# Patient Record
Sex: MALE | Race: WHITE | NOT HISPANIC OR LATINO | URBAN - METROPOLITAN AREA
[De-identification: names, ages, dates, MRNs, and addresses within clinical notes are randomized per-mention and may not be internally consistent; named-entity substitution may affect disease eponyms.]

---

## 2023-11-14 ENCOUNTER — INPATIENT (INPATIENT)
Facility: HOSPITAL | Age: 63
LOS: 0 days | Discharge: HOME | DRG: 274 | End: 2023-11-15
Attending: INTERNAL MEDICINE | Admitting: INTERNAL MEDICINE
Payer: COMMERCIAL

## 2023-11-14 VITALS — HEIGHT: 68 IN

## 2023-11-14 DIAGNOSIS — Z98.890 OTHER SPECIFIED POSTPROCEDURAL STATES: Chronic | ICD-10-CM

## 2023-11-14 DIAGNOSIS — I48.0 PAROXYSMAL ATRIAL FIBRILLATION: ICD-10-CM

## 2023-11-14 DIAGNOSIS — Z90.49 ACQUIRED ABSENCE OF OTHER SPECIFIED PARTS OF DIGESTIVE TRACT: Chronic | ICD-10-CM

## 2023-11-14 LAB
ANION GAP SERPL CALC-SCNC: 10 MMOL/L — SIGNIFICANT CHANGE UP (ref 5–17)
ANION GAP SERPL CALC-SCNC: 10 MMOL/L — SIGNIFICANT CHANGE UP (ref 5–17)
APTT BLD: 42.5 SEC — HIGH (ref 24.5–35.6)
APTT BLD: 42.5 SEC — HIGH (ref 24.5–35.6)
BLD GP AB SCN SERPL QL: NEGATIVE — SIGNIFICANT CHANGE UP
BLD GP AB SCN SERPL QL: NEGATIVE — SIGNIFICANT CHANGE UP
BUN SERPL-MCNC: 13 MG/DL — SIGNIFICANT CHANGE UP (ref 7–23)
BUN SERPL-MCNC: 13 MG/DL — SIGNIFICANT CHANGE UP (ref 7–23)
CALCIUM SERPL-MCNC: 9.5 MG/DL — SIGNIFICANT CHANGE UP (ref 8.4–10.5)
CALCIUM SERPL-MCNC: 9.5 MG/DL — SIGNIFICANT CHANGE UP (ref 8.4–10.5)
CHLORIDE SERPL-SCNC: 104 MMOL/L — SIGNIFICANT CHANGE UP (ref 96–108)
CHLORIDE SERPL-SCNC: 104 MMOL/L — SIGNIFICANT CHANGE UP (ref 96–108)
CO2 SERPL-SCNC: 26 MMOL/L — SIGNIFICANT CHANGE UP (ref 22–31)
CO2 SERPL-SCNC: 26 MMOL/L — SIGNIFICANT CHANGE UP (ref 22–31)
CREAT SERPL-MCNC: 1.38 MG/DL — HIGH (ref 0.5–1.3)
CREAT SERPL-MCNC: 1.38 MG/DL — HIGH (ref 0.5–1.3)
EGFR: 57 ML/MIN/1.73M2 — LOW
EGFR: 57 ML/MIN/1.73M2 — LOW
GLUCOSE SERPL-MCNC: 91 MG/DL — SIGNIFICANT CHANGE UP (ref 70–99)
GLUCOSE SERPL-MCNC: 91 MG/DL — SIGNIFICANT CHANGE UP (ref 70–99)
HCT VFR BLD CALC: 45 % — SIGNIFICANT CHANGE UP (ref 39–50)
HCT VFR BLD CALC: 45 % — SIGNIFICANT CHANGE UP (ref 39–50)
HGB BLD-MCNC: 14.6 G/DL — SIGNIFICANT CHANGE UP (ref 13–17)
HGB BLD-MCNC: 14.6 G/DL — SIGNIFICANT CHANGE UP (ref 13–17)
INR BLD: 1.31 — HIGH (ref 0.85–1.18)
INR BLD: 1.31 — HIGH (ref 0.85–1.18)
ISTAT ACTK (ACTIVATED CLOTTING TIME KAOLIN): 358 SEC — HIGH (ref 74–137)
ISTAT ACTK (ACTIVATED CLOTTING TIME KAOLIN): 358 SEC — HIGH (ref 74–137)
ISTAT ACTK (ACTIVATED CLOTTING TIME KAOLIN): 363 SEC — HIGH (ref 74–137)
ISTAT ACTK (ACTIVATED CLOTTING TIME KAOLIN): 363 SEC — HIGH (ref 74–137)
ISTAT ACTK (ACTIVATED CLOTTING TIME KAOLIN): 379 SEC — HIGH (ref 74–137)
ISTAT ACTK (ACTIVATED CLOTTING TIME KAOLIN): 379 SEC — HIGH (ref 74–137)
ISTAT ACTK (ACTIVATED CLOTTING TIME KAOLIN): 401 SEC — HIGH (ref 74–137)
ISTAT ACTK (ACTIVATED CLOTTING TIME KAOLIN): 401 SEC — HIGH (ref 74–137)
MCHC RBC-ENTMCNC: 28.8 PG — SIGNIFICANT CHANGE UP (ref 27–34)
MCHC RBC-ENTMCNC: 28.8 PG — SIGNIFICANT CHANGE UP (ref 27–34)
MCHC RBC-ENTMCNC: 32.4 GM/DL — SIGNIFICANT CHANGE UP (ref 32–36)
MCHC RBC-ENTMCNC: 32.4 GM/DL — SIGNIFICANT CHANGE UP (ref 32–36)
MCV RBC AUTO: 88.8 FL — SIGNIFICANT CHANGE UP (ref 80–100)
MCV RBC AUTO: 88.8 FL — SIGNIFICANT CHANGE UP (ref 80–100)
NRBC # BLD: 0 /100 WBCS — SIGNIFICANT CHANGE UP (ref 0–0)
NRBC # BLD: 0 /100 WBCS — SIGNIFICANT CHANGE UP (ref 0–0)
PLATELET # BLD AUTO: 201 K/UL — SIGNIFICANT CHANGE UP (ref 150–400)
PLATELET # BLD AUTO: 201 K/UL — SIGNIFICANT CHANGE UP (ref 150–400)
POTASSIUM SERPL-MCNC: 4.4 MMOL/L — SIGNIFICANT CHANGE UP (ref 3.5–5.3)
POTASSIUM SERPL-MCNC: 4.4 MMOL/L — SIGNIFICANT CHANGE UP (ref 3.5–5.3)
POTASSIUM SERPL-SCNC: 4.4 MMOL/L — SIGNIFICANT CHANGE UP (ref 3.5–5.3)
POTASSIUM SERPL-SCNC: 4.4 MMOL/L — SIGNIFICANT CHANGE UP (ref 3.5–5.3)
PROTHROM AB SERPL-ACNC: 14.8 SEC — HIGH (ref 9.5–13)
PROTHROM AB SERPL-ACNC: 14.8 SEC — HIGH (ref 9.5–13)
RBC # BLD: 5.07 M/UL — SIGNIFICANT CHANGE UP (ref 4.2–5.8)
RBC # BLD: 5.07 M/UL — SIGNIFICANT CHANGE UP (ref 4.2–5.8)
RBC # FLD: 13.3 % — SIGNIFICANT CHANGE UP (ref 10.3–14.5)
RBC # FLD: 13.3 % — SIGNIFICANT CHANGE UP (ref 10.3–14.5)
RH IG SCN BLD-IMP: POSITIVE — SIGNIFICANT CHANGE UP
RH IG SCN BLD-IMP: POSITIVE — SIGNIFICANT CHANGE UP
SODIUM SERPL-SCNC: 140 MMOL/L — SIGNIFICANT CHANGE UP (ref 135–145)
SODIUM SERPL-SCNC: 140 MMOL/L — SIGNIFICANT CHANGE UP (ref 135–145)
WBC # BLD: 26.42 K/UL — HIGH (ref 3.8–10.5)
WBC # BLD: 26.42 K/UL — HIGH (ref 3.8–10.5)
WBC # FLD AUTO: 26.42 K/UL — HIGH (ref 3.8–10.5)
WBC # FLD AUTO: 26.42 K/UL — HIGH (ref 3.8–10.5)

## 2023-11-14 RX ORDER — SACUBITRIL AND VALSARTAN 24; 26 MG/1; MG/1
1 TABLET, FILM COATED ORAL
Refills: 0 | DISCHARGE

## 2023-11-14 RX ORDER — ATORVASTATIN CALCIUM 80 MG/1
80 TABLET, FILM COATED ORAL AT BEDTIME
Refills: 0 | Status: DISCONTINUED | OUTPATIENT
Start: 2023-11-14 | End: 2023-11-15

## 2023-11-14 RX ORDER — SACUBITRIL AND VALSARTAN 24; 26 MG/1; MG/1
1 TABLET, FILM COATED ORAL
Refills: 0 | Status: DISCONTINUED | OUTPATIENT
Start: 2023-11-14 | End: 2023-11-15

## 2023-11-14 RX ORDER — EMPAGLIFLOZIN 10 MG/1
1 TABLET, FILM COATED ORAL
Refills: 0 | DISCHARGE

## 2023-11-14 RX ORDER — COLCHICINE 0.6 MG
0.6 TABLET ORAL DAILY
Refills: 0 | Status: DISCONTINUED | OUTPATIENT
Start: 2023-11-14 | End: 2023-11-15

## 2023-11-14 RX ORDER — PANTOPRAZOLE SODIUM 20 MG/1
40 TABLET, DELAYED RELEASE ORAL
Refills: 0 | Status: DISCONTINUED | OUTPATIENT
Start: 2023-11-14 | End: 2023-11-15

## 2023-11-14 RX ORDER — CLOPIDOGREL BISULFATE 75 MG/1
1 TABLET, FILM COATED ORAL
Refills: 0 | DISCHARGE

## 2023-11-14 RX ORDER — APIXABAN 2.5 MG/1
1 TABLET, FILM COATED ORAL
Refills: 0 | DISCHARGE

## 2023-11-14 RX ORDER — ATORVASTATIN CALCIUM 80 MG/1
1 TABLET, FILM COATED ORAL
Refills: 0 | DISCHARGE

## 2023-11-14 RX ORDER — APIXABAN 2.5 MG/1
5 TABLET, FILM COATED ORAL
Refills: 0 | Status: DISCONTINUED | OUTPATIENT
Start: 2023-11-14 | End: 2023-11-15

## 2023-11-14 RX ORDER — CLOPIDOGREL BISULFATE 75 MG/1
75 TABLET, FILM COATED ORAL DAILY
Refills: 0 | Status: DISCONTINUED | OUTPATIENT
Start: 2023-11-14 | End: 2023-11-15

## 2023-11-14 RX ORDER — SPIRONOLACTONE 25 MG/1
1 TABLET, FILM COATED ORAL
Refills: 0 | DISCHARGE

## 2023-11-14 RX ORDER — SPIRONOLACTONE 25 MG/1
25 TABLET, FILM COATED ORAL DAILY
Refills: 0 | Status: DISCONTINUED | OUTPATIENT
Start: 2023-11-14 | End: 2023-11-15

## 2023-11-14 RX ORDER — LIDOCAINE HYDROCHLORIDE AND EPINEPHRINE 10; 10 MG/ML; UG/ML
10 INJECTION, SOLUTION INFILTRATION; PERINEURAL ONCE
Refills: 0 | Status: DISCONTINUED | OUTPATIENT
Start: 2023-11-14 | End: 2023-11-15

## 2023-11-14 RX ORDER — METOPROLOL TARTRATE 50 MG
25 TABLET ORAL DAILY
Refills: 0 | Status: DISCONTINUED | OUTPATIENT
Start: 2023-11-14 | End: 2023-11-15

## 2023-11-14 RX ORDER — METOPROLOL TARTRATE 50 MG
50 TABLET ORAL DAILY
Refills: 0 | Status: DISCONTINUED | OUTPATIENT
Start: 2023-11-14 | End: 2023-11-14

## 2023-11-14 RX ADMIN — Medication 0.6 MILLIGRAM(S): at 18:12

## 2023-11-14 RX ADMIN — APIXABAN 5 MILLIGRAM(S): 2.5 TABLET, FILM COATED ORAL at 22:05

## 2023-11-14 NOTE — H&P ADULT - HISTORY OF PRESENT ILLNESS
62 yo M with history of HTm, HLD, DM, prostate ca, s/p prostatectomy, paroxysmal atrial fibrillation presents for scheduled ablation.  Patient states that he experienced palpitation since he was about 20 yo, they lasted for short time  but lately palpitations became longer in durations and intensity , last episode lasted about 2 day , patient was symptomatic with fatigue,  decreased  ADL. He is complaint with Eliquis (last dose 11/14/23), today he is in sinus rhythm .     64 yo M with history of HTM, HLD, DM, CAD, s/p stents ,  prostate ca, s/p prostatectomy, paroxysmal atrial fibrillation presents for scheduled ablation.  Patient states that he experienced palpitation since he was about 18 yo, they lasted for short time  but lately palpitations became longer in durations and intensity , last episode lasted about 2 day , patient was symptomatic with fatigue,  decreased  ADL. He is complaint with Eliquis (last dose 11/14/23), today he is in sinus rhythm .

## 2023-11-14 NOTE — H&P ADULT - NSICDXPASTSURGICALHX_GEN_ALL_CORE_FT
PAST SURGICAL HISTORY:  H/O hernia repair     History of appendectomy     History of prostate surgery

## 2023-11-14 NOTE — BRIEF OPERATIVE NOTE - OPERATION/FINDINGS
S/p double transseptal puncture with LA voltage map showing normal voltage throughout with highly irritable/active pulmonary veins (3 on the right, 2 on the left). S/p PVI with dissociated potentials from the right pulmonary veins. Entrance and exit block achieved and confirmed. Small posterior wall noted so performed roof line for remaining AFib/flutter substrate remaining after PVI. CTI line performed for prevention of typical flutter. Bidirectional block confirmed with all lines. Heparinized throughout and reversed with protamine. Vascade and manual pressure for hemostasis. No immediate complications.

## 2023-11-14 NOTE — PATIENT PROFILE ADULT - FALL HARM RISK - UNIVERSAL INTERVENTIONS
Bed in lowest position, wheels locked, appropriate side rails in place/Call bell, personal items and telephone in reach/Instruct patient to call for assistance before getting out of bed or chair/Non-slip footwear when patient is out of bed/[place] to call system/Physically safe environment - no spills, clutter or unnecessary equipment/Purposeful Proactive Rounding/Room/bathroom lighting operational, light cord in reach

## 2023-11-14 NOTE — H&P ADULT - ASSESSMENT
62 yo M with history of HTm, HLD, DM, prostate ca, s/p prostatectomy, paroxysmal atrial fibrillation presents for scheduled ablation.   64 yo M with history of HTm, HLD, DM,  CAD, prostate ca, s/p prostatectomy, paroxysmal atrial fibrillation presents for scheduled ablation.

## 2023-11-14 NOTE — H&P ADULT - NSICDXPASTMEDICALHX_GEN_ALL_CORE_FT
PAST MEDICAL HISTORY:  History of prostate surgery     HLD (hyperlipidemia)     HTN (hypertension)

## 2023-11-15 VITALS
RESPIRATION RATE: 17 BRPM | OXYGEN SATURATION: 93 % | HEART RATE: 80 BPM | SYSTOLIC BLOOD PRESSURE: 91 MMHG | DIASTOLIC BLOOD PRESSURE: 52 MMHG

## 2023-11-15 LAB
HCV AB S/CO SERPL IA: 0.05 S/CO — SIGNIFICANT CHANGE UP
HCV AB S/CO SERPL IA: 0.05 S/CO — SIGNIFICANT CHANGE UP
HCV AB SERPL-IMP: SIGNIFICANT CHANGE UP
HCV AB SERPL-IMP: SIGNIFICANT CHANGE UP
ISTAT INR: 1.3 — HIGH (ref 0.88–1.16)
ISTAT INR: 1.3 — HIGH (ref 0.88–1.16)
ISTAT PT: 14.9 SEC — HIGH (ref 10–12.9)
ISTAT PT: 14.9 SEC — HIGH (ref 10–12.9)
ISTAT VENOUS BE: 1 MMOL/L — SIGNIFICANT CHANGE UP (ref -2–3)
ISTAT VENOUS GLUCOSE: 83 MG/DL — SIGNIFICANT CHANGE UP (ref 70–99)
ISTAT VENOUS GLUCOSE: 83 MG/DL — SIGNIFICANT CHANGE UP (ref 70–99)
ISTAT VENOUS GLUCOSE: 88 MG/DL — SIGNIFICANT CHANGE UP (ref 70–99)
ISTAT VENOUS GLUCOSE: 88 MG/DL — SIGNIFICANT CHANGE UP (ref 70–99)
ISTAT VENOUS HCO3: 25 MMOL/L — SIGNIFICANT CHANGE UP (ref 23–28)
ISTAT VENOUS HCO3: 25 MMOL/L — SIGNIFICANT CHANGE UP (ref 23–28)
ISTAT VENOUS HCO3: 27 MMOL/L — SIGNIFICANT CHANGE UP (ref 23–28)
ISTAT VENOUS HCO3: 27 MMOL/L — SIGNIFICANT CHANGE UP (ref 23–28)
ISTAT VENOUS HEMATOCRIT: 41 % — SIGNIFICANT CHANGE UP (ref 39–50)
ISTAT VENOUS HEMATOCRIT: 41 % — SIGNIFICANT CHANGE UP (ref 39–50)
ISTAT VENOUS HEMATOCRIT: 44 % — SIGNIFICANT CHANGE UP (ref 39–50)
ISTAT VENOUS HEMATOCRIT: 44 % — SIGNIFICANT CHANGE UP (ref 39–50)
ISTAT VENOUS HEMOGLOBIN: 13.9 GM/DL — SIGNIFICANT CHANGE UP (ref 13–17)
ISTAT VENOUS HEMOGLOBIN: 13.9 GM/DL — SIGNIFICANT CHANGE UP (ref 13–17)
ISTAT VENOUS HEMOGLOBIN: 15 GM/DL — SIGNIFICANT CHANGE UP (ref 13–17)
ISTAT VENOUS HEMOGLOBIN: 15 GM/DL — SIGNIFICANT CHANGE UP (ref 13–17)
ISTAT VENOUS IONIZED CALCIUM: 0.94 MMOL/L — LOW (ref 1.12–1.3)
ISTAT VENOUS IONIZED CALCIUM: 0.94 MMOL/L — LOW (ref 1.12–1.3)
ISTAT VENOUS IONIZED CALCIUM: 1.22 MMOL/L — SIGNIFICANT CHANGE UP (ref 1.12–1.3)
ISTAT VENOUS IONIZED CALCIUM: 1.22 MMOL/L — SIGNIFICANT CHANGE UP (ref 1.12–1.3)
ISTAT VENOUS PCO2: 37 MMHG — LOW (ref 41–51)
ISTAT VENOUS PCO2: 37 MMHG — LOW (ref 41–51)
ISTAT VENOUS PCO2: 43 MMHG — SIGNIFICANT CHANGE UP (ref 41–51)
ISTAT VENOUS PCO2: 43 MMHG — SIGNIFICANT CHANGE UP (ref 41–51)
ISTAT VENOUS PH: 7.4 — SIGNIFICANT CHANGE UP (ref 7.31–7.41)
ISTAT VENOUS PH: 7.4 — SIGNIFICANT CHANGE UP (ref 7.31–7.41)
ISTAT VENOUS PH: 7.44 — HIGH (ref 7.31–7.41)
ISTAT VENOUS PH: 7.44 — HIGH (ref 7.31–7.41)
ISTAT VENOUS PO2: <66 MMHG — HIGH (ref 35–40)
ISTAT VENOUS PO2: <66 MMHG — HIGH (ref 35–40)
ISTAT VENOUS PO2: <66 MMHG — LOW (ref 35–40)
ISTAT VENOUS PO2: <66 MMHG — LOW (ref 35–40)
ISTAT VENOUS POTASSIUM: 5.2 MMOL/L — SIGNIFICANT CHANGE UP (ref 3.5–5.3)
ISTAT VENOUS POTASSIUM: 5.2 MMOL/L — SIGNIFICANT CHANGE UP (ref 3.5–5.3)
ISTAT VENOUS POTASSIUM: >9 MMOL/L — CRITICAL HIGH (ref 3.5–5.3)
ISTAT VENOUS POTASSIUM: >9 MMOL/L — CRITICAL HIGH (ref 3.5–5.3)
ISTAT VENOUS SO2: 63 % — SIGNIFICANT CHANGE UP
ISTAT VENOUS SO2: 63 % — SIGNIFICANT CHANGE UP
ISTAT VENOUS SO2: 88 % — SIGNIFICANT CHANGE UP
ISTAT VENOUS SO2: 88 % — SIGNIFICANT CHANGE UP
ISTAT VENOUS SODIUM: 132 MMOL/L — LOW (ref 135–145)
ISTAT VENOUS SODIUM: 132 MMOL/L — LOW (ref 135–145)
ISTAT VENOUS SODIUM: 140 MMOL/L — SIGNIFICANT CHANGE UP (ref 135–145)
ISTAT VENOUS SODIUM: 140 MMOL/L — SIGNIFICANT CHANGE UP (ref 135–145)
ISTAT VENOUS TCO2: 26 MMOL/L — SIGNIFICANT CHANGE UP (ref 22–31)
ISTAT VENOUS TCO2: 26 MMOL/L — SIGNIFICANT CHANGE UP (ref 22–31)
ISTAT VENOUS TCO2: 28 MMOL/L — SIGNIFICANT CHANGE UP (ref 22–31)
ISTAT VENOUS TCO2: 28 MMOL/L — SIGNIFICANT CHANGE UP (ref 22–31)
POCT ISTAT CREATININE: 1.5 MG/DL — HIGH (ref 0.5–1.3)
POCT ISTAT CREATININE: 1.5 MG/DL — HIGH (ref 0.5–1.3)

## 2023-11-15 PROCEDURE — 86900 BLOOD TYPING SEROLOGIC ABO: CPT

## 2023-11-15 PROCEDURE — 85730 THROMBOPLASTIN TIME PARTIAL: CPT

## 2023-11-15 PROCEDURE — 80048 BASIC METABOLIC PNL TOTAL CA: CPT

## 2023-11-15 PROCEDURE — C1730: CPT

## 2023-11-15 PROCEDURE — 82947 ASSAY GLUCOSE BLOOD QUANT: CPT

## 2023-11-15 PROCEDURE — C1760: CPT

## 2023-11-15 PROCEDURE — 85027 COMPLETE CBC AUTOMATED: CPT

## 2023-11-15 PROCEDURE — 82565 ASSAY OF CREATININE: CPT

## 2023-11-15 PROCEDURE — C2630: CPT

## 2023-11-15 PROCEDURE — 85347 COAGULATION TIME ACTIVATED: CPT

## 2023-11-15 PROCEDURE — C1769: CPT

## 2023-11-15 PROCEDURE — 82803 BLOOD GASES ANY COMBINATION: CPT

## 2023-11-15 PROCEDURE — 82330 ASSAY OF CALCIUM: CPT

## 2023-11-15 PROCEDURE — C1766: CPT

## 2023-11-15 PROCEDURE — 99239 HOSP IP/OBS DSCHRG MGMT >30: CPT

## 2023-11-15 PROCEDURE — 85610 PROTHROMBIN TIME: CPT

## 2023-11-15 PROCEDURE — 84132 ASSAY OF SERUM POTASSIUM: CPT

## 2023-11-15 PROCEDURE — 36415 COLL VENOUS BLD VENIPUNCTURE: CPT

## 2023-11-15 PROCEDURE — 86850 RBC ANTIBODY SCREEN: CPT

## 2023-11-15 PROCEDURE — 86803 HEPATITIS C AB TEST: CPT

## 2023-11-15 PROCEDURE — 86901 BLOOD TYPING SEROLOGIC RH(D): CPT

## 2023-11-15 PROCEDURE — 84295 ASSAY OF SERUM SODIUM: CPT

## 2023-11-15 PROCEDURE — C1894: CPT

## 2023-11-15 PROCEDURE — C1759: CPT

## 2023-11-15 PROCEDURE — 85014 HEMATOCRIT: CPT

## 2023-11-15 RX ORDER — COLCHICINE 0.6 MG
1 TABLET ORAL
Qty: 7 | Refills: 0
Start: 2023-11-15 | End: 2023-11-21

## 2023-11-15 RX ORDER — PANTOPRAZOLE SODIUM 20 MG/1
1 TABLET, DELAYED RELEASE ORAL
Qty: 30 | Refills: 0
Start: 2023-11-15 | End: 2023-12-14

## 2023-11-15 RX ORDER — METOPROLOL TARTRATE 50 MG
1 TABLET ORAL
Qty: 0 | Refills: 0 | DISCHARGE
Start: 2023-11-15

## 2023-11-15 RX ORDER — METOPROLOL TARTRATE 50 MG
1 TABLET ORAL
Refills: 0 | DISCHARGE

## 2023-11-15 RX ORDER — METOPROLOL TARTRATE 50 MG
1 TABLET ORAL
Qty: 30 | Refills: 1
Start: 2023-11-15 | End: 2024-01-13

## 2023-11-15 RX ADMIN — Medication 0.6 MILLIGRAM(S): at 11:45

## 2023-11-15 RX ADMIN — ATORVASTATIN CALCIUM 80 MILLIGRAM(S): 80 TABLET, FILM COATED ORAL at 06:51

## 2023-11-15 RX ADMIN — APIXABAN 5 MILLIGRAM(S): 2.5 TABLET, FILM COATED ORAL at 06:48

## 2023-11-15 RX ADMIN — SPIRONOLACTONE 25 MILLIGRAM(S): 25 TABLET, FILM COATED ORAL at 06:48

## 2023-11-15 RX ADMIN — PANTOPRAZOLE SODIUM 40 MILLIGRAM(S): 20 TABLET, DELAYED RELEASE ORAL at 06:48

## 2023-11-15 RX ADMIN — CLOPIDOGREL BISULFATE 75 MILLIGRAM(S): 75 TABLET, FILM COATED ORAL at 06:57

## 2023-11-15 NOTE — DISCHARGE NOTE NURSING/CASE MANAGEMENT/SOCIAL WORK - PATIENT PORTAL LINK FT
You can access the FollowMyHealth Patient Portal offered by [facility] by registering at the following website: [email or web address]. By joining [facility]’s FollowMyHealth portal, you will also be able to view your health information using other applications (apps) compatible with our system.

## 2023-11-15 NOTE — DISCHARGE NOTE PROVIDER - HOSPITAL COURSE
62 yo M with history of HTM, HLD, DM, CAD, s/p stents ,  prostate ca, s/p prostatectomy, paroxysmal atrial fibrillation presents for scheduled ablation  Patient had successful ablation on 11/14/23, there were no complications.  Bl groin check no bleeding, no swelling, no hematoma.  Patient is sinus rhythm, stable for discharge.  Metoprolol was decreased to 25 mg daily, patient was discharged with Protonix and colchicine.

## 2023-11-15 NOTE — DISCHARGE NOTE PROVIDER - CARE PROVIDER_API CALL
Elvis Zuleta  Cardiac Electrophysiology  [address] [place] [id number]  Phone: [phone]  Fax: [phone]  Follow Up Time:

## 2023-11-15 NOTE — DISCHARGE NOTE PROVIDER - NSDCCPTREATMENT_GEN_ALL_CORE_FT
PRINCIPAL PROCEDURE  Procedure: Electrophysiology study with ablation of focus atrial fibrillation  Findings and Treatment:

## 2023-11-15 NOTE — DISCHARGE NOTE PROVIDER - NSDCMRMEDTOKEN_GEN_ALL_CORE_FT
clopidogrel 75 mg oral tablet: 1 tab(s) orally once a day  colchicine 0.6 mg oral tablet: 1 tab(s) orally once a day  Eliquis 5 mg oral tablet: 1 tab(s) orally 2 times a day  Entresto 49 mg-51 mg oral tablet: 1 tab(s) orally 2 times a day  Jardiance 10 mg oral tablet: 1 tab(s) orally once a day  Lipitor 80 mg oral tablet: 1 tab(s) orally once a day  metoprolol succinate 25 mg oral tablet, extended release: 1 tab(s) orally once a day  metoprolol succinate 25 mg oral tablet, extended release: 1 tab(s) orally once a day  pantoprazole 40 mg oral delayed release tablet: 1 tab(s) orally once a day (before a meal)  spironolactone 25 mg oral tablet: 1 tab(s) orally once a day

## 2023-11-15 NOTE — DISCHARGE NOTE PROVIDER - NSDCFUADDAPPT_GEN_ALL_CORE_FT
Follow up Dr. Zuleta , call his office [phone] to schedule an appointment or if you have questions.   Avoid heavy lifting, exercise and strenuous activity for 7 days   Showering is OK.   Decrease metoprolol to 25 mg daily   Prescriptions for Protonix, colchicine and Metoprolol were sent to your pharmacy.

## 2023-11-15 NOTE — DISCHARGE NOTE NURSING/CASE MANAGEMENT/SOCIAL WORK - NSDCFUADDAPPT_GEN_ALL_CORE_FT
Follow up Dr. Zuleta , call his office [phone] to schedule an appointment or if you have questions.   Avoid heavy lifting, exercise and strenuous activity for 7 days   Showering is OK.   Decrease metoprolol to 25 mg daily   Prescriptions for Protonix, colchicine and Metoprolol were sent to your pharmacy.

## 2023-11-17 DIAGNOSIS — I10 ESSENTIAL (PRIMARY) HYPERTENSION: ICD-10-CM

## 2023-11-17 DIAGNOSIS — E78.5 HYPERLIPIDEMIA, UNSPECIFIED: ICD-10-CM

## 2023-11-17 DIAGNOSIS — Z85.46 PERSONAL HISTORY OF MALIGNANT NEOPLASM OF PROSTATE: ICD-10-CM

## 2023-11-17 DIAGNOSIS — Z88.0 ALLERGY STATUS TO PENICILLIN: ICD-10-CM

## 2023-11-17 DIAGNOSIS — Z79.84 LONG TERM (CURRENT) USE OF ORAL HYPOGLYCEMIC DRUGS: ICD-10-CM

## 2023-11-17 DIAGNOSIS — Z79.01 LONG TERM (CURRENT) USE OF ANTICOAGULANTS: ICD-10-CM

## 2023-11-17 DIAGNOSIS — Z95.5 PRESENCE OF CORONARY ANGIOPLASTY IMPLANT AND GRAFT: ICD-10-CM

## 2023-11-17 DIAGNOSIS — I48.0 PAROXYSMAL ATRIAL FIBRILLATION: ICD-10-CM

## 2023-11-17 DIAGNOSIS — E11.9 TYPE 2 DIABETES MELLITUS WITHOUT COMPLICATIONS: ICD-10-CM

## 2023-11-17 DIAGNOSIS — I25.10 ATHEROSCLEROTIC HEART DISEASE OF NATIVE CORONARY ARTERY WITHOUT ANGINA PECTORIS: ICD-10-CM
